# Patient Record
Sex: FEMALE
[De-identification: names, ages, dates, MRNs, and addresses within clinical notes are randomized per-mention and may not be internally consistent; named-entity substitution may affect disease eponyms.]

---

## 2024-03-01 ENCOUNTER — TRANSCRIPTION ENCOUNTER (OUTPATIENT)
Age: 24
End: 2024-03-01

## 2024-03-01 ENCOUNTER — APPOINTMENT (OUTPATIENT)
Dept: RHEUMATOLOGY | Facility: CLINIC | Age: 24
End: 2024-03-01
Payer: SELF-PAY

## 2024-03-01 VITALS
BODY MASS INDEX: 22.08 KG/M2 | TEMPERATURE: 98.6 F | DIASTOLIC BLOOD PRESSURE: 90 MMHG | OXYGEN SATURATION: 97 % | HEIGHT: 62 IN | WEIGHT: 120 LBS | RESPIRATION RATE: 16 BRPM | HEART RATE: 89 BPM | SYSTOLIC BLOOD PRESSURE: 148 MMHG

## 2024-03-01 DIAGNOSIS — I73.00 RAYNAUD'S SYNDROME W/OUT GANGRENE: ICD-10-CM

## 2024-03-01 DIAGNOSIS — M62.89 OTHER SPECIFIED DISORDERS OF MUSCLE: ICD-10-CM

## 2024-03-01 DIAGNOSIS — K59.9 FUNCTIONAL INTESTINAL DISORDER, UNSPECIFIED: ICD-10-CM

## 2024-03-01 DIAGNOSIS — M62.838 OTHER MUSCLE SPASM: ICD-10-CM

## 2024-03-01 DIAGNOSIS — M79.2 NEURALGIA AND NEURITIS, UNSPECIFIED: ICD-10-CM

## 2024-03-01 DIAGNOSIS — K21.9 GASTRO-ESOPHAGEAL REFLUX DISEASE W/OUT ESOPHAGITIS: ICD-10-CM

## 2024-03-01 DIAGNOSIS — R53.82 CHRONIC FATIGUE, UNSPECIFIED: ICD-10-CM

## 2024-03-01 DIAGNOSIS — L50.9 URTICARIA, UNSPECIFIED: ICD-10-CM

## 2024-03-01 DIAGNOSIS — L50.3 DERMATOGRAPHIC URTICARIA: ICD-10-CM

## 2024-03-01 DIAGNOSIS — Z78.9 OTHER SPECIFIED HEALTH STATUS: ICD-10-CM

## 2024-03-01 DIAGNOSIS — M25.50 PAIN IN UNSPECIFIED JOINT: ICD-10-CM

## 2024-03-01 DIAGNOSIS — F32.A ANXIETY DISORDER, UNSPECIFIED: ICD-10-CM

## 2024-03-01 DIAGNOSIS — F31.9 BIPOLAR DISORDER, UNSPECIFIED: ICD-10-CM

## 2024-03-01 DIAGNOSIS — F41.9 ANXIETY DISORDER, UNSPECIFIED: ICD-10-CM

## 2024-03-01 DIAGNOSIS — T14.8XXA OTHER INJURY OF UNSPECIFIED BODY REGION, INITIAL ENCOUNTER: ICD-10-CM

## 2024-03-01 DIAGNOSIS — Q79.62 HYPERMOBILE EHLERS-DANLOS SYNDROME: ICD-10-CM

## 2024-03-01 DIAGNOSIS — F90.9 ATTENTION-DEFICIT HYPERACTIVITY DISORDER, UNSPECIFIED TYPE: ICD-10-CM

## 2024-03-01 DIAGNOSIS — G90.1 FAMILIAL DYSAUTONOMIA [RILEY-DAY]: ICD-10-CM

## 2024-03-01 DIAGNOSIS — M32.9 SYSTEMIC LUPUS ERYTHEMATOSUS, UNSPECIFIED: ICD-10-CM

## 2024-03-01 DIAGNOSIS — K31.84 GASTROPARESIS: ICD-10-CM

## 2024-03-01 PROBLEM — Z00.00 ENCOUNTER FOR PREVENTIVE HEALTH EXAMINATION: Status: ACTIVE | Noted: 2024-03-01

## 2024-03-01 PROCEDURE — 36415 COLL VENOUS BLD VENIPUNCTURE: CPT

## 2024-03-01 PROCEDURE — 99205 OFFICE O/P NEW HI 60 MIN: CPT

## 2024-03-01 RX ORDER — CYCLOBENZAPRINE HCL 5 MG
5 TABLET ORAL
Refills: 0 | Status: ACTIVE | COMMUNITY

## 2024-03-01 RX ORDER — GABAPENTIN 600 MG/1
600 TABLET, COATED ORAL TWICE DAILY
Refills: 0 | Status: ACTIVE | COMMUNITY

## 2024-03-01 RX ORDER — LAMOTRIGINE 200 MG/1
200 TABLET ORAL
Refills: 0 | Status: ACTIVE | COMMUNITY

## 2024-03-01 RX ORDER — OLANZAPINE 2.5 MG/1
2.5 TABLET ORAL
Refills: 0 | Status: ACTIVE | COMMUNITY

## 2024-03-01 RX ORDER — ETONOGESTREL 68 MG/1
IMPLANT SUBCUTANEOUS
Refills: 0 | Status: ACTIVE | COMMUNITY

## 2024-03-01 RX ORDER — HYDROXYCHLOROQUINE SULFATE 200 MG/1
TABLET ORAL
Refills: 0 | Status: ACTIVE | COMMUNITY

## 2024-03-01 RX ORDER — LINACLOTIDE 290 UG/1
290 CAPSULE, GELATIN COATED ORAL
Refills: 0 | Status: ACTIVE | COMMUNITY

## 2024-03-01 RX ORDER — DOCUSATE SODIUM 100 MG/1
100 CAPSULE ORAL
Refills: 0 | Status: ACTIVE | COMMUNITY

## 2024-03-01 RX ORDER — ONDANSETRON HYDROCHLORIDE 4 MG/1
4 TABLET, FILM COATED ORAL EVERY 8 HOURS
Refills: 0 | Status: ACTIVE | COMMUNITY

## 2024-03-01 RX ORDER — DULOXETINE HYDROCHLORIDE 60 MG/1
60 CAPSULE, DELAYED RELEASE ORAL
Refills: 0 | Status: ACTIVE | COMMUNITY

## 2024-03-01 RX ORDER — OMEPRAZOLE MAGNESIUM 20 MG/1
20 CAPSULE, DELAYED RELEASE ORAL
Refills: 0 | Status: ACTIVE | COMMUNITY

## 2024-03-01 RX ORDER — TESTOSTERONE 100 MG/ML
VIAL (ML) INTRAMUSCULAR
Refills: 0 | Status: ACTIVE | COMMUNITY

## 2024-03-04 PROBLEM — G90.1 DYSAUTONOMIA: Status: ACTIVE | Noted: 2024-03-04

## 2024-03-04 PROBLEM — K59.9 MOTILITY DISORDER OF INTESTINE: Status: ACTIVE | Noted: 2024-03-04

## 2024-03-04 PROBLEM — L50.3 DERMATOGRAPHISM: Status: ACTIVE | Noted: 2024-03-04

## 2024-03-04 PROBLEM — M62.89 PELVIC FLOOR INSTABILITY: Status: ACTIVE | Noted: 2024-03-04

## 2024-03-04 PROBLEM — I73.00 PRIMARY RAYNAUD'S PHENOMENON: Status: ACTIVE | Noted: 2024-03-04

## 2024-03-04 PROBLEM — R53.82 CHRONIC FATIGUE: Status: ACTIVE | Noted: 2024-03-04

## 2024-03-04 PROBLEM — M32.9 SLE WITH NORMAL KIDNEYS: Status: ACTIVE | Noted: 2024-03-04

## 2024-03-04 PROBLEM — L50.9 URTICARIA: Status: ACTIVE | Noted: 2024-03-04

## 2024-03-04 PROBLEM — F90.9 ADHD: Status: ACTIVE | Noted: 2024-03-04

## 2024-03-04 PROBLEM — M62.838 MUSCLE SPASM: Status: ACTIVE | Noted: 2024-03-01

## 2024-03-04 PROBLEM — K31.84 GASTROPARESIS: Status: ACTIVE | Noted: 2024-03-04

## 2024-03-04 PROBLEM — M79.2 NERVE PAIN: Status: ACTIVE | Noted: 2024-03-01

## 2024-03-04 PROBLEM — F41.9 ANXIETY AND DEPRESSION: Status: ACTIVE | Noted: 2024-03-04

## 2024-03-04 PROBLEM — Z78.9 FEMALE-TO-MALE TRANSGENDER PERSON: Status: ACTIVE | Noted: 2024-03-01

## 2024-03-04 PROBLEM — F31.9 BIPOLAR DEPRESSION: Status: ACTIVE | Noted: 2024-03-01

## 2024-03-04 PROBLEM — T14.8XXA SUBLUXATION: Status: ACTIVE | Noted: 2024-03-04

## 2024-03-04 PROBLEM — K21.9 CHRONIC GERD: Status: ACTIVE | Noted: 2024-03-04

## 2024-03-04 NOTE — PHYSICAL EXAM
[Total Score ___] : Total Score = [unfilled] [>= 5 pubertal men and women to age 50] : >= 5 pubertal men and women to age 50 [Beighton Score: ___/ 9] : Beighton Score: [unfilled]/ 9 [Can you now (or could you ever) place your hands flat on the floor without bending your knees?] : Patient can (or previously) place hands flat on the floor without bending their knees [Can you now (or could you ever) bend your thumb to touch your forearm?] : Patient can (or previously) bend their thumb to touch their forearm [Do you consider yourself "double jointed"?] : Patient considers themselves to be "double jointed" [As a child, did you amuse  your friends by contorting your body into strange shapes or could you do the splits?] : Patient, as a child, was able to contort body or perform splits [Unusually soft or velvety skin] : Unusually soft or velvety skin [Mild skin hyperextensibility] : Mild skin hyperextensibility [Bilateral piezogenic papules of the heel] : Bilateral piezogenic papules of the heel [Unexplained striae distensae or rubae at the back, groins, thighs, breasts and/or abdomen in adolescents,] : Unexplained striae distensae or rubae at the back, groins, thighs, breasts and/or abdomen in adolescents, men or pre-pubertal women without a history significant gain or loss of body fat or weight [Atrophic scarring involving at least two sites and without the formation of truly papyraceous and/or] : Atrophic scarring involving at least two sites and without the formation of truly papyraceous and/or hemosideric scars as seen in classical EDS [Pelvic floor, rectal, and/or uterine prolapse in children, men or nulliparous women without a history] : Pelvic floor, rectal, and/or uterine prolapse in children, men or nulliparous women without a history of morbid obesity or other known predisposing medical condition [Dental crowding and high or narrow palate] : Dental crowding and high or narrow palate [Feature A Total: ___/ 12] : Feature A Total: [unfilled]/ 12 [Musculoskeletal pain in two or more limbs, recurring daily for at least 3 months] : Musculoskeletal pain in two or more limbs, recurring daily for at least 3 months [Chronic, widespread pain for 3 months] : Chronic, widespread pain for 3 months [Recurrent joint dislocations or debora joint instability, in the absence of trauma] : Recurrent joint dislocations or debora joint instability, in the absence of trauma [No] : No [No CVA Tenderness] : no ~M costovertebral angle tenderness [Nail Clubbing] : no clubbing  or cyanosis of the fingernails [Musculoskeletal - Swelling] : no joint swelling seen [Skin Color & Pigmentation] : normal skin color and pigmentation [No Focal Deficits] : no focal deficits [Oriented To Time, Place, And Person] : oriented to person, place, and time [General Appearance - Alert] : alert [General Appearance - In No Acute Distress] : in no acute distress [Sclera] : the sclera and conjunctiva were normal [Extraocular Movements] : extraocular movements were intact [PERRL With Normal Accommodation] : pupils were equal in size, round, and reactive to light [Outer Ear] : the ears and nose were normal in appearance [Neck Appearance] : the appearance of the neck was normal [Oropharynx] : the oropharynx was normal [Absence of unusual skin fragility, which should prompt consideration of other types of EDS] : 1. Absence of unusual skin fragility, which should prompt consideration of other types of EDS [Exclusion of other heritable and acquired connective tissue disorders, including autoimmune rheumatologic] : 2. Exclusion of other heritable and acquired connective tissue disorders, including autoimmune rheumatologic conditions. In patients with an acquired CTD (e.g. Lupus, Rheumatoid Arthritis, etc.), additional diagnosis of hEDS requires meeting both Features A and B of Criterion 2. Feature C of Criterion 2 (chronic pain and/or instability) cannot be counted toward a diagnosis of hEDS in this situation [Exclusion of alternative diagnoses that may also include joint hypermobility by means of hypotonia and/or] : 3. Exclusion of alternative diagnoses that may also include joint hypermobility by means of hypotonia and/or connective tissue laxity. Alternative diagnoses and diagnostic categories include, but are not limited to, neuromuscular disorders (e.g. [place] myopathy), other hereditary disorders of the connective tissue (e.g. other types of EDS, Loeys-Elmira syndrome, Marfan syndrome), and skeletal dysplasias (e.g. osteogenesis imperfecta). Exclusion of these considerations may be based upon history, physical examination, and/or molecular genetic testing, as indicated. [Right] : Right: N [Left] : Left: N [Bilateral] : bilateral negative [] : No [FreeTextEntry4] : 157.48 [FreeTextEntry5] : 81 [FreeTextEntry6] : 162 [de-identified] : 1.03 [FreeTextEntry7] : 75 [FreeTextEntry8] : 82 [FreeTextEntry9] : 0.92 [FreeTextEntry2] : Pt has hyperopia and double vision since childhood (Rx +3.25).  [TWNoteComboBox1] : 0 [TWNoteComboBox2] : 0 [TWNoteComboBox4] : 0 [TWNoteComboBox3] : 0 [TWNoteComboBox6] : 0 [TWNoteComboBox5] : 0 [TWNoteComboBox7] : 0 [TWNoteComboBox8] : 0 [TWNoteComboBox9] : 0 [de-identified] : 0 [de-identified] : 0 [de-identified] : 0 [de-identified] : 0 [de-identified] : 0 [de-identified] : 1 [de-identified] : 0 [FreeTextEntry1] : Hands- Bilat #5 digit >90 degree extension. oppositional thumb extension to ipsilateral forearm. Negative Walker and Arvind sign.  Wrists- B/L laxity Elbows- normal  Shoulders- B/L laxity Hips- normal Knees- B/L 5% hyperextension Ankles- B/L laxity Feet- normal

## 2024-03-04 NOTE — ASSESSMENT
[FreeTextEntry1] : 22 y/o M presents for an evaluation of EDS. He been experiencing joint hypermobility and joint pain since childhood, most notable in the ankles, knees and hips with frequent joint dislocations and subluxations of the knees and shoulders. Currently polyarthralgia affecting the L>R hip and SI joint is most bothersome.   The clinical diagnosis of hypermobile EDS needs the simultaneous presence of all criteria, 1 and 2 and 3. CRITERION 1 - Beighton Score: 7/9. [+can place her hands flat on the floor without bending the knees, +can bend her thumb to touch the forearm, +as a child she amused friends by contorting her body into strange shapes or the splits, +as a child or teenager, her shoulder or kneecap dislocated on more than one occasion, +considers herself double jointed].  CRITERION 2 - Feature A total: 7/12, Feature B: 0/1, Feature C: 3/3. CRITERION 3 - 3/3. The patient exhibits joint hypermobility on today's exam meeting the criteria for hypermobile EDS. The patient has skin laxity/hyperextensibility as well. The patient reports additional features often associated with hEDS including: IBS sxs w/ constipation alternating with diarrhea, bloating, nausea, and pain, gastroparesis w/ bowel dysmotility, chronic GERD, disabling, persistent fatigue, anxiety, depression, autonomic dysfunction sxs w/ palpitations, abnormal sweating and near-syncope due to postural tachycardia, and multiple allergies/sensitivities including rashes, chronic urticaria and dermatographia.  The patient's hEDS will be further evaluated with genetic lab testing for evaluation of vascular-type EDS.  - HCDT genetic sequencing drawn in office today. - Referral to obtain echo/TTE. - PT and discussed features of [place] method. Also discussed avoiding stressors on her joints by encouraging hypermobility such as yoga. - We also discussed need for PT and joint preservation techniques to prevent early onset osteoarthritic changes and connective tissue issues (ie labrum, RTC, tendinitis, etc). - encouraged upcoming eval/establishing care with PT Trevor Kits - c/w current massage therapy regimen  - c/w all home medications as prescribed    RTO Will call pt within 4-6 weeks for lab results.

## 2024-03-04 NOTE — ADDENDUM
[FreeTextEntry1] : INikkie wrote this note acting as a scribe for Dr. Tera Maciel MD on Mar 01, 2024 .  I, Dr. Tera Maciel MD, ordering physician, have read and attest that all the information, medical decision making and discharge instructions within are true and accurate on 03/01/2024.

## 2024-03-07 ENCOUNTER — TRANSCRIPTION ENCOUNTER (OUTPATIENT)
Age: 24
End: 2024-03-07

## 2024-03-25 LAB
MISCELLANEOUS TEST: NORMAL
PROC NAME: NORMAL

## 2024-07-17 ENCOUNTER — TRANSCRIPTION ENCOUNTER (OUTPATIENT)
Age: 24
End: 2024-07-17

## 2024-09-10 ENCOUNTER — TRANSCRIPTION ENCOUNTER (OUTPATIENT)
Age: 24
End: 2024-09-10